# Patient Record
Sex: MALE | Race: BLACK OR AFRICAN AMERICAN | NOT HISPANIC OR LATINO | Employment: PART TIME | URBAN - METROPOLITAN AREA
[De-identification: names, ages, dates, MRNs, and addresses within clinical notes are randomized per-mention and may not be internally consistent; named-entity substitution may affect disease eponyms.]

---

## 2023-07-10 ENCOUNTER — OFFICE VISIT (OUTPATIENT)
Dept: URGENT CARE | Facility: CLINIC | Age: 23
End: 2023-07-10
Payer: COMMERCIAL

## 2023-07-10 ENCOUNTER — APPOINTMENT (OUTPATIENT)
Dept: RADIOLOGY | Facility: CLINIC | Age: 23
End: 2023-07-10
Payer: COMMERCIAL

## 2023-07-10 VITALS
HEART RATE: 67 BPM | OXYGEN SATURATION: 99 % | RESPIRATION RATE: 18 BRPM | TEMPERATURE: 98.3 F | DIASTOLIC BLOOD PRESSURE: 63 MMHG | SYSTOLIC BLOOD PRESSURE: 115 MMHG
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
## 2023-07-10 DIAGNOSIS — S43.402A SPRAIN OF LEFT SHOULDER, UNSPECIFIED SHOULDER SPRAIN TYPE, INITIAL ENCOUNTER: Primary | ICD-10-CM

## 2023-07-10 PROCEDURE — 99213 OFFICE O/P EST LOW 20 MIN: CPT | Performed by: PHYSICIAN ASSISTANT

## 2023-07-10 PROCEDURE — 73030 X-RAY EXAM OF SHOULDER: CPT

## 2023-07-10 NOTE — PROGRESS NOTES
[facility]        NAME: Kirit Kessler is a 20 y.o. male  : 2000    MRN: [id number]  DATE: July 10, 2023  TIME: 2:26 PM    Assessment and Plan   Sprain of left shoulder, unspecified shoulder sprain type, initial encounter [S43.402A]  1. Sprain of left shoulder, unspecified shoulder sprain type, initial encounter  XR shoulder 2+ vw left    Ambulatory referral to Orthopedic Surgery    Orthopedic injury treatment        XR provider read: no acute fracture or dislocation. Patient declined ortho referral as he lives in [place] and is on vacation. Patient Instructions     Tylenol/Ibuprofen for pain  Wear sling for support as needed (remove sling for gentle range of motion every 3 hours)  Ice 20 minutes 3-4 times per day for 3 days  Insulate the skin from the ice to prevent frostbite  Rest   Follow up with orthopedic if symptoms do not improve  Follow up with PCP in 3-5 days. Proceed to  ER if symptoms worsen. Remove splint/brace and go straight to ER if you experience sudden increase in pain, swelling, change in color/temperature/sensation, chest pain, shortness or breath, or if you start coughing up blood. Chief Complaint     Chief Complaint   Patient presents with   • Shoulder Injury     Left shoulder, fell while roller blading, joint was popped out, patient popped back in, c/o pain, pain 2/10, took alleve         History of Present Illness       Patient states he felt his L shoulder anteriorly dislocate. He "popped it back in". He does not believe it is currently dislocated. Reports pain with ROM. Shoulder Injury   The left shoulder is affected. The injury mechanism was a fall (onto L shoulder). The quality of the pain is described as aching. The pain does not radiate. Pertinent negatives include no numbness or tingling. The symptoms are aggravated by movement. He has tried NSAIDs for the symptoms.        Review of Systems   Review of Systems   Musculoskeletal: Negative for joint swelling. Skin: Negative for color change. Neurological: Negative for tingling, weakness and numbness. Current Medications     No current outpatient medications on file. Current Allergies     Allergies as of 07/10/2023   • (No Known Allergies)            The following portions of the patient's history were reviewed and updated as appropriate: allergies, current medications, past family history, past medical history, past social history, past surgical history and problem list.     History reviewed. No pertinent past medical history. History reviewed. No pertinent surgical history. History reviewed. No pertinent family history. Medications have been verified. Objective   /63   Pulse 67   Temp 98.3 °F (36.8 °C)   Resp 18   SpO2 99%   No LMP for male patient. Physical Exam     Physical Exam  Vitals reviewed. Constitutional:       General: He is not in acute distress. Appearance: He is well-developed. HENT:      Head: Normocephalic and atraumatic. Cardiovascular:      Rate and Rhythm: Normal rate and regular rhythm. Heart sounds: Normal heart sounds. No murmur heard. No friction rub. No gallop. Pulmonary:      Effort: Pulmonary effort is normal. No respiratory distress. Breath sounds: Normal breath sounds. No wheezing, rhonchi or rales. Musculoskeletal:         General: No swelling, tenderness or deformity. Normal range of motion. Comments: Slow but full ROM. Pain with L shoulder internal rotation only. Skin:     General: Skin is warm. Findings: No bruising or erythema. Neurological:      Mental Status: He is alert and oriented to person, place, and time. Sensory: No sensory deficit. Psychiatric:         Behavior: Behavior normal.         Thought Content:  Thought content normal.         Judgment: Judgment normal.       Orthopedic injury treatment    Date/Time: 7/10/2023 1:00 PM    Performed by: Adalgisa Brandt KARLENE  Authorized by: Jaime Meadows PA-C  Jermyn Protocol:  Consent: Verbal consent obtained. Risks and benefits: risks, benefits and alternatives were discussed  Consent given by: patient  Patient identity confirmed: verbally with patient      Injury location: L shoulder.   Neurovascular status: Neurovascularly intact    Distal perfusion: normal    Neurological function: normal    Range of motion: normal    Immobilization:  Sling  Neurovascular status: Neurovascularly intact    Distal perfusion: normal    Neurological function: normal    Range of motion: unchanged    Patient tolerance:  Patient tolerated the procedure well with no immediate complications

## 2023-07-10 NOTE — PATIENT INSTRUCTIONS
Tylenol/Ibuprofen for pain  Wear sling for support as needed (remove sling for gentle range of motion every 3 hours)  Ice 20 minutes 3-4 times per day for 3 days  Insulate the skin from the ice to prevent frostbite  Rest   Follow up with orthopedic if symptoms do not improve  Follow up with PCP in 3-5 days. Proceed to  ER if symptoms worsen. Remove splint/brace and go straight to ER if you experience sudden increase in pain, swelling, change in color/temperature/sensation, chest pain, shortness or breath, or if you start coughing up blood.